# Patient Record
Sex: MALE | Race: WHITE | NOT HISPANIC OR LATINO | Employment: FULL TIME | URBAN - METROPOLITAN AREA
[De-identification: names, ages, dates, MRNs, and addresses within clinical notes are randomized per-mention and may not be internally consistent; named-entity substitution may affect disease eponyms.]

---

## 2017-09-22 DIAGNOSIS — Z12.5 SCREENING FOR PROSTATE CANCER: ICD-10-CM

## 2017-09-22 DIAGNOSIS — Z00.00 HEALTH CARE MAINTENANCE: Primary | ICD-10-CM

## 2017-09-26 LAB
ALBUMIN SERPL-MCNC: 4.5 G/DL (ref 3.5–5.2)
ALBUMIN/GLOB SERPL: 1.9 G/DL
ALP SERPL-CCNC: 91 U/L (ref 39–117)
ALT SERPL-CCNC: 19 U/L (ref 1–41)
APPEARANCE UR: CLEAR
AST SERPL-CCNC: 17 U/L (ref 1–40)
BACTERIA #/AREA URNS HPF: NORMAL /HPF
BASOPHILS # BLD AUTO: 0.03 10*3/MM3 (ref 0–0.2)
BASOPHILS NFR BLD AUTO: 0.4 % (ref 0–1.5)
BILIRUB SERPL-MCNC: 0.5 MG/DL (ref 0.1–1.2)
BILIRUB UR QL STRIP: NEGATIVE
BUN SERPL-MCNC: 19 MG/DL (ref 8–23)
BUN/CREAT SERPL: 18.1 (ref 7–25)
CALCIUM SERPL-MCNC: 9.7 MG/DL (ref 8.6–10.5)
CHLORIDE SERPL-SCNC: 102 MMOL/L (ref 98–107)
CHOLEST SERPL-MCNC: 146 MG/DL (ref 0–200)
CO2 SERPL-SCNC: 27.9 MMOL/L (ref 22–29)
COLOR UR: YELLOW
CREAT SERPL-MCNC: 1.05 MG/DL (ref 0.76–1.27)
EOSINOPHIL # BLD AUTO: 0.33 10*3/MM3 (ref 0–0.7)
EOSINOPHIL NFR BLD AUTO: 4.6 % (ref 0.3–6.2)
EPI CELLS #/AREA URNS HPF: NORMAL /HPF
ERYTHROCYTE [DISTWIDTH] IN BLOOD BY AUTOMATED COUNT: 12.9 % (ref 11.5–14.5)
GLOBULIN SER CALC-MCNC: 2.4 GM/DL
GLUCOSE SERPL-MCNC: 89 MG/DL (ref 65–99)
GLUCOSE UR QL: NEGATIVE
HBA1C MFR BLD: 5.16 % (ref 4.8–5.6)
HCT VFR BLD AUTO: 45.4 % (ref 40.4–52.2)
HDLC SERPL-MCNC: 48 MG/DL (ref 40–60)
HGB BLD-MCNC: 14.9 G/DL (ref 13.7–17.6)
HGB UR QL STRIP: NEGATIVE
IMM GRANULOCYTES # BLD: 0 10*3/MM3 (ref 0–0.03)
IMM GRANULOCYTES NFR BLD: 0 % (ref 0–0.5)
KETONES UR QL STRIP: NEGATIVE
LDLC SERPL CALC-MCNC: 82 MG/DL (ref 0–100)
LEUKOCYTE ESTERASE UR QL STRIP: NEGATIVE
LYMPHOCYTES # BLD AUTO: 1.74 10*3/MM3 (ref 0.9–4.8)
LYMPHOCYTES NFR BLD AUTO: 24.4 % (ref 19.6–45.3)
MCH RBC QN AUTO: 30.2 PG (ref 27–32.7)
MCHC RBC AUTO-ENTMCNC: 32.8 G/DL (ref 32.6–36.4)
MCV RBC AUTO: 92.1 FL (ref 79.8–96.2)
MICRO URNS: NORMAL
MICRO URNS: NORMAL
MONOCYTES # BLD AUTO: 0.44 10*3/MM3 (ref 0.2–1.2)
MONOCYTES NFR BLD AUTO: 6.2 % (ref 5–12)
MUCOUS THREADS URNS QL MICRO: PRESENT /HPF
NEUTROPHILS # BLD AUTO: 4.6 10*3/MM3 (ref 1.9–8.1)
NEUTROPHILS NFR BLD AUTO: 64.4 % (ref 42.7–76)
NITRITE UR QL STRIP: NEGATIVE
PH UR STRIP: 5.5 [PH] (ref 5–7.5)
PLATELET # BLD AUTO: 297 10*3/MM3 (ref 140–500)
POTASSIUM SERPL-SCNC: 4.7 MMOL/L (ref 3.5–5.2)
PROT SERPL-MCNC: 6.9 G/DL (ref 6–8.5)
PROT UR QL STRIP: NEGATIVE
PSA SERPL-MCNC: 1.6 NG/ML (ref 0–4)
RBC # BLD AUTO: 4.93 10*6/MM3 (ref 4.6–6)
RBC #/AREA URNS HPF: NORMAL /HPF
SODIUM SERPL-SCNC: 141 MMOL/L (ref 136–145)
SP GR UR: 1.02 (ref 1–1.03)
TRIGL SERPL-MCNC: 80 MG/DL (ref 0–150)
TSH SERPL DL<=0.005 MIU/L-ACNC: 1.14 MIU/ML (ref 0.27–4.2)
URINALYSIS REFLEX: NORMAL
UROBILINOGEN UR STRIP-MCNC: 0.2 MG/DL (ref 0.2–1)
VLDLC SERPL CALC-MCNC: 16 MG/DL (ref 5–40)
WBC # BLD AUTO: 7.14 10*3/MM3 (ref 4.5–10.7)
WBC #/AREA URNS HPF: NORMAL /HPF

## 2017-09-27 ENCOUNTER — OFFICE VISIT (OUTPATIENT)
Dept: INTERNAL MEDICINE | Facility: CLINIC | Age: 62
End: 2017-09-27

## 2017-09-27 VITALS
OXYGEN SATURATION: 99 % | DIASTOLIC BLOOD PRESSURE: 74 MMHG | SYSTOLIC BLOOD PRESSURE: 102 MMHG | WEIGHT: 237 LBS | BODY MASS INDEX: 30.42 KG/M2 | HEART RATE: 56 BPM | HEIGHT: 74 IN | RESPIRATION RATE: 18 BRPM

## 2017-09-27 DIAGNOSIS — Z00.00 HEALTH MAINTENANCE EXAMINATION: Primary | ICD-10-CM

## 2017-09-27 DIAGNOSIS — Z23 NEED FOR INFLUENZA VACCINATION: ICD-10-CM

## 2017-09-27 PROCEDURE — 90471 IMMUNIZATION ADMIN: CPT | Performed by: INTERNAL MEDICINE

## 2017-09-27 PROCEDURE — 90656 IIV3 VACC NO PRSV 0.5 ML IM: CPT | Performed by: INTERNAL MEDICINE

## 2017-09-27 PROCEDURE — 99396 PREV VISIT EST AGE 40-64: CPT | Performed by: INTERNAL MEDICINE

## 2017-09-27 NOTE — PROGRESS NOTES
Chief Complaint  Scotty Zhang is a 61 y.o. male who presents for Annual Exam (CPE)  .    History of Present Illness   Scotty is here for routine CPE.  No new concerns.  They are still deciding on their final plans for a place in [place].  They will retain a place here as well.    Prostate Exam: last year  C-scope:11/30/16 due 2021    Exercise: 15,000 steps a day.  Runs 4 days a week.  Lifts weights.      Immunization History   Administered Date(s) Administered   • Flu Vaccine Quad PF >18YRS 09/27/2017   • Influenza, Quadrivalent 11/10/2015, 10/17/2016   • Tdap 08/01/2016   • Zoster 06/08/2015       Review of Systems   Constitution: Negative for chills, fever and malaise/fatigue.   HENT: Negative for hearing loss, hoarse voice and sore throat.    Eyes: Negative for blurred vision, double vision and visual disturbance.   Cardiovascular: Negative for chest pain, leg swelling and palpitations.   Respiratory: Negative for cough and shortness of breath.    Endocrine: Negative for polydipsia and polyuria.   Hematologic/Lymphatic: Does not bruise/bleed easily.   Skin: Negative for rash and suspicious lesions.   Musculoskeletal: Negative for arthritis and myalgias.   Gastrointestinal: Negative for abdominal pain, change in bowel habit, constipation, diarrhea, dysphagia, hematochezia, melena, nausea and vomiting.   Genitourinary: Negative for dysuria and hematuria.   Neurological: Negative for dizziness, headaches and light-headedness.   Psychiatric/Behavioral: Negative for depression. The patient is not nervous/anxious.        Patient Active Problem List   Diagnosis   • Mild intermittent asthma   • Left-sided low back pain without sciatica   • Lumbar degenerative disc disease   • Abnormal x-ray of lumbar spine       Past Medical History:   Diagnosis Date   • Asthma        Past Surgical History:   Procedure Laterality Date   • COLONOSCOPY N/A 11/30/2016    Procedure: COLONOSCOPY TO CECUM AND TI WITH COLD BX POLYPECTOMY ;   "Surgeon: Aaron Patel MD;  Location: [facility] ENDOSCOPY;  Service:    • KNEE ARTHROSCOPY     • NOSE SURGERY     • THUMB ARTHROSCOPY         Family History   Problem Relation Age of Onset   • Bone cancer Mother    • Heart disease Father    • Diabetes Father    • Hypertension Father        Social History     Social History   • Marital status:      Spouse name: N/A   • Number of children: N/A   • Years of education: N/A     Occupational History   • retired      Social History Main Topics   • Smoking status: Never Smoker   • Smokeless tobacco: Never Used   • Alcohol use No   • Drug use: Not on file   • Sexual activity: Defer     Other Topics Concern   • Not on file     Social History Narrative       No current outpatient prescriptions on file prior to visit.     No current facility-administered medications on file prior to visit.        No Known Allergies    Vitals:    09/27/17 1050   BP: 102/74   Pulse: 56   Resp: 18   SpO2: 99%   Weight: 237 lb (108 kg)   Height: 74\" (188 cm)       Body mass index is 30.43 kg/(m^2).    Objective   Physical Exam   Constitutional: He is oriented to person, place, and time. He appears well-developed and well-nourished. No distress.   HENT:   Head: Normocephalic and atraumatic.   Right Ear: Hearing and external ear normal.   Left Ear: Hearing and external ear normal.   Nose: Nose normal.   Mouth/Throat: Oropharynx is clear and moist and mucous membranes are normal.   Eyes: Conjunctivae, EOM and lids are normal. Pupils are equal, round, and reactive to light. No scleral icterus.   Neck: Trachea normal and normal range of motion. Neck supple. No thyromegaly present.   Cardiovascular: Normal rate, regular rhythm, normal heart sounds and intact distal pulses.  Exam reveals no gallop and no friction rub.    No murmur heard.  Pulses:       Carotid pulses are 2+ on the right side, and 2+ on the left side.       Femoral pulses are 2+ on the right side, and 2+ on the left side.       " Dorsalis pedis pulses are 2+ on the right side, and 2+ on the left side.        Posterior tibial pulses are 2+ on the right side, and 2+ on the left side.   Pulmonary/Chest: Effort normal and breath sounds normal. No respiratory distress. He has no wheezes. He has no rales.   Abdominal: Soft. Bowel sounds are normal. He exhibits no distension and no mass. There is no tenderness.   Genitourinary: Rectum normal and prostate normal.   Musculoskeletal: Normal range of motion. He exhibits no edema.       Vascular Status -  His exam exhibits no right foot edema. His exam exhibits no left foot edema.   Skin Integrity  -  His right foot skin is intact.     Scotty 's left foot skin is intact. .  Lymphadenopathy:     He has no cervical adenopathy.        Right: No supraclavicular adenopathy present.        Left: No supraclavicular adenopathy present.   Neurological: He is alert and oriented to person, place, and time. He has normal strength. No cranial nerve deficit. He exhibits normal muscle tone. Coordination and gait normal.   Reflex Scores:       Patellar reflexes are 2+ on the right side and 2+ on the left side.  Skin: Skin is warm and dry. No rash noted.   Psychiatric: He has a normal mood and affect. His speech is normal and behavior is normal. Judgment and thought content normal. His affect is not inappropriate. Cognition and memory are normal.   Vitals reviewed.        Results for orders placed or performed in visit on 09/22/17   Comprehensive Metabolic Panel   Result Value Ref Range    Glucose 89 65 - 99 mg/dL    BUN 19 8 - 23 mg/dL    Creatinine 1.05 0.76 - 1.27 mg/dL    eGFR Non African Am 71 >60 mL/min/1.73    eGFR African Am 86 >60 mL/min/1.73    BUN/Creatinine Ratio 18.1 7.0 - 25.0    Sodium 141 136 - 145 mmol/L    Potassium 4.7 3.5 - 5.2 mmol/L    Chloride 102 98 - 107 mmol/L    Total CO2 27.9 22.0 - 29.0 mmol/L    Calcium 9.7 8.6 - 10.5 mg/dL    Total Protein 6.9 6.0 - 8.5 g/dL    Albumin 4.50 3.50 - 5.20  g/dL    Globulin 2.4 gm/dL    A/G Ratio 1.9 g/dL    Total Bilirubin 0.5 0.1 - 1.2 mg/dL    Alkaline Phosphatase 91 39 - 117 U/L    AST (SGOT) 17 1 - 40 U/L    ALT (SGPT) 19 1 - 41 U/L   Lipid Panel   Result Value Ref Range    Total Cholesterol 146 0 - 200 mg/dL    Triglycerides 80 0 - 150 mg/dL    HDL Cholesterol 48 40 - 60 mg/dL    VLDL Cholesterol 16 5 - 40 mg/dL    LDL Cholesterol  82 0 - 100 mg/dL   TSH Rfx On Abnormal To Free T4   Result Value Ref Range    TSH 1.14 0.27 - 4.2 mIU/mL   UA / M With / Rflx Culture([facility] ONLY)   Result Value Ref Range    Specific Gravity, UA 1.022 1.005 - 1.030    pH, UA 5.5 5.0 - 7.5    Color, UA Yellow Yellow    Appearance, UA Clear Clear    Leukocytes, UA Negative Negative    Protein Negative Negative/Trace    Glucose, UA Negative Negative    Ketones Negative Negative    Blood, UA Negative Negative    Bilirubin, UA Negative Negative    Urobilinogen, UA 0.2 0.2 - 1.0 mg/dL    Nitrite, UA Negative Negative    Microscopic Examination Comment     MICROSCOPIC EXAMINATION See below:     Urinalysis Reflex Comment    Hemoglobin A1c   Result Value Ref Range    Hemoglobin A1C 5.16 4.80 - 5.60 %   PSA   Result Value Ref Range    PSA 1.600 0.000 - 4.000 ng/mL   Microscopic Examination   Result Value Ref Range    WBC, UA 0-5 0 - 5 /hpf    RBC, UA 0-2 0 - 2 /hpf    Epithelial Cells (non renal) None seen 0 - 10 /hpf    Mucus, UA Present Not Estab. /hpf    Bacteria, UA None seen None seen/Few /hpf   CBC & Differential   Result Value Ref Range    WBC 7.14 4.50 - 10.70 10*3/mm3    RBC 4.93 4.60 - 6.00 10*6/mm3    Hemoglobin 14.9 13.7 - 17.6 g/dL    Hematocrit 45.4 40.4 - 52.2 %    MCV 92.1 79.8 - 96.2 fL    MCH 30.2 27.0 - 32.7 pg    MCHC 32.8 32.6 - 36.4 g/dL    RDW 12.9 11.5 - 14.5 %    Platelets 297 140 - 500 10*3/mm3    Neutrophil Rel % 64.4 42.7 - 76.0 %    Lymphocyte Rel % 24.4 19.6 - 45.3 %    Monocyte Rel % 6.2 5.0 - 12.0 %    Eosinophil Rel % 4.6 0.3 - 6.2 %    Basophil Rel % 0.4 0.0 -  1.5 %    Neutrophils Absolute 4.60 1.90 - 8.10 10*3/mm3    Lymphocytes Absolute 1.74 0.90 - 4.80 10*3/mm3    Monocytes Absolute 0.44 0.20 - 1.20 10*3/mm3    Eosinophils Absolute 0.33 0.00 - 0.70 10*3/mm3    Basophils Absolute 0.03 0.00 - 0.20 10*3/mm3    Immature Granulocyte Rel % 0.0 0.0 - 0.5 %    Immature Grans Absolute 0.00 0.00 - 0.03 10*3/mm3       Assessment/Plan   Diagnoses and all orders for this visit:    Health maintenance examination    Need for influenza vaccination  -     Flu Vaccine Quad PF >18YR        Discussion/Summary  Scotty is here for routine follow up.  He is a very healthy 62 y/o with no medical issues.  His labs all look great.  He is up to date on all health maintenance.  He exercises very regularly.  I will see him back in a year.        Return in about 1 year (around 9/27/2018) for Annual physical, with fasting labs prior.

## 2018-09-20 DIAGNOSIS — Z12.5 SCREENING FOR PROSTATE CANCER: ICD-10-CM

## 2018-09-20 DIAGNOSIS — Z00.00 HEALTH CARE MAINTENANCE: Primary | ICD-10-CM

## 2018-09-28 LAB
ALBUMIN SERPL-MCNC: 4.3 G/DL (ref 3.5–5.2)
ALBUMIN/GLOB SERPL: 1.3 G/DL
ALP SERPL-CCNC: 105 U/L (ref 39–117)
ALT SERPL-CCNC: 20 U/L (ref 1–41)
APPEARANCE UR: CLEAR
AST SERPL-CCNC: 15 U/L (ref 1–40)
BACTERIA #/AREA URNS HPF: ABNORMAL /HPF
BASOPHILS # BLD AUTO: 0.03 10*3/MM3 (ref 0–0.2)
BASOPHILS NFR BLD AUTO: 0.4 % (ref 0–1.5)
BILIRUB SERPL-MCNC: 0.7 MG/DL (ref 0.1–1.2)
BILIRUB UR QL STRIP: NEGATIVE
BUN SERPL-MCNC: 18 MG/DL (ref 8–23)
BUN/CREAT SERPL: 14.9 (ref 7–25)
CALCIUM SERPL-MCNC: 9.4 MG/DL (ref 8.6–10.5)
CASTS URNS MICRO: ABNORMAL
CASTS URNS QL MICRO: PRESENT /LPF
CHLORIDE SERPL-SCNC: 103 MMOL/L (ref 98–107)
CHOLEST SERPL-MCNC: 149 MG/DL (ref 0–200)
CO2 SERPL-SCNC: 27.4 MMOL/L (ref 22–29)
COLOR UR: YELLOW
CREAT SERPL-MCNC: 1.21 MG/DL (ref 0.76–1.27)
EOSINOPHIL # BLD AUTO: 0.3 10*3/MM3 (ref 0–0.7)
EOSINOPHIL NFR BLD AUTO: 4.1 % (ref 0.3–6.2)
EPI CELLS #/AREA URNS HPF: ABNORMAL /HPF
ERYTHROCYTE [DISTWIDTH] IN BLOOD BY AUTOMATED COUNT: 13 % (ref 11.5–14.5)
GLOBULIN SER CALC-MCNC: 3.2 GM/DL
GLUCOSE SERPL-MCNC: 92 MG/DL (ref 65–99)
GLUCOSE UR QL: NEGATIVE
HBA1C MFR BLD: 5.4 % (ref 4.8–5.6)
HCT VFR BLD AUTO: 47 % (ref 40.4–52.2)
HDLC SERPL-MCNC: 50 MG/DL (ref 40–60)
HGB BLD-MCNC: 15.2 G/DL (ref 13.7–17.6)
HGB UR QL STRIP: NEGATIVE
IMM GRANULOCYTES # BLD: 0 10*3/MM3 (ref 0–0.03)
IMM GRANULOCYTES NFR BLD: 0 % (ref 0–0.5)
KETONES UR QL STRIP: ABNORMAL
LDLC SERPL CALC-MCNC: 88 MG/DL (ref 0–100)
LEUKOCYTE ESTERASE UR QL STRIP: NEGATIVE
LYMPHOCYTES # BLD AUTO: 2.1 10*3/MM3 (ref 0.9–4.8)
LYMPHOCYTES NFR BLD AUTO: 28.5 % (ref 19.6–45.3)
MCH RBC QN AUTO: 29.3 PG (ref 27–32.7)
MCHC RBC AUTO-ENTMCNC: 32.3 G/DL (ref 32.6–36.4)
MCV RBC AUTO: 90.7 FL (ref 79.8–96.2)
MICRO URNS: ABNORMAL
MICRO URNS: ABNORMAL
MONOCYTES # BLD AUTO: 0.53 10*3/MM3 (ref 0.2–1.2)
MONOCYTES NFR BLD AUTO: 7.2 % (ref 5–12)
MUCOUS THREADS URNS QL MICRO: PRESENT /HPF
NEUTROPHILS # BLD AUTO: 4.4 10*3/MM3 (ref 1.9–8.1)
NEUTROPHILS NFR BLD AUTO: 59.8 % (ref 42.7–76)
NITRITE UR QL STRIP: NEGATIVE
PH UR STRIP: 6 [PH] (ref 5–7.5)
PLATELET # BLD AUTO: 321 10*3/MM3 (ref 140–500)
POTASSIUM SERPL-SCNC: 4.5 MMOL/L (ref 3.5–5.2)
PROT SERPL-MCNC: 7.5 G/DL (ref 6–8.5)
PROT UR QL STRIP: ABNORMAL
PSA SERPL-MCNC: 1.47 NG/ML (ref 0–4)
RBC # BLD AUTO: 5.18 10*6/MM3 (ref 4.6–6)
RBC #/AREA URNS HPF: ABNORMAL /HPF
SODIUM SERPL-SCNC: 142 MMOL/L (ref 136–145)
SP GR UR: 1.02 (ref 1–1.03)
TRIGL SERPL-MCNC: 57 MG/DL (ref 0–150)
TSH SERPL DL<=0.005 MIU/L-ACNC: 1.57 MIU/ML (ref 0.27–4.2)
URINALYSIS REFLEX: ABNORMAL
UROBILINOGEN UR STRIP-MCNC: 1 MG/DL (ref 0.2–1)
VLDLC SERPL CALC-MCNC: 11.4 MG/DL (ref 5–40)
WBC # BLD AUTO: 7.36 10*3/MM3 (ref 4.5–10.7)
WBC #/AREA URNS HPF: ABNORMAL /HPF

## 2018-10-01 ENCOUNTER — OFFICE VISIT (OUTPATIENT)
Dept: INTERNAL MEDICINE | Facility: CLINIC | Age: 63
End: 2018-10-01

## 2018-10-01 VITALS
HEART RATE: 63 BPM | RESPIRATION RATE: 18 BRPM | OXYGEN SATURATION: 98 % | HEIGHT: 74 IN | DIASTOLIC BLOOD PRESSURE: 74 MMHG | SYSTOLIC BLOOD PRESSURE: 110 MMHG | BODY MASS INDEX: 31.06 KG/M2 | WEIGHT: 242 LBS

## 2018-10-01 DIAGNOSIS — Z23 NEED FOR INFLUENZA VACCINATION: ICD-10-CM

## 2018-10-01 DIAGNOSIS — Z00.00 HEALTH MAINTENANCE EXAMINATION: Primary | ICD-10-CM

## 2018-10-01 PROCEDURE — G0008 ADMIN INFLUENZA VIRUS VAC: HCPCS | Performed by: INTERNAL MEDICINE

## 2018-10-01 PROCEDURE — 90674 CCIIV4 VAC NO PRSV 0.5 ML IM: CPT | Performed by: INTERNAL MEDICINE

## 2018-10-01 PROCEDURE — 99396 PREV VISIT EST AGE 40-64: CPT | Performed by: INTERNAL MEDICINE

## 2018-10-01 NOTE — PROGRESS NOTES
Chief Complaint  Scotty Zhang is a 62 y.o. male who presents for Annual Exam (CPE)  .    History of Present Illness   Scotty is here for routine CPE.  No  New concerns.  He is living in [place] full time.    Prostate Exam: last year  C-scope:11/30/2016, repeat in 2021    Exercise: Walks and or runs 6 miles daily, lifts weights, 4 times weekly, stretching classes as well.     Immunization History   Administered Date(s) Administered   • Flu Mist 11/10/2015, 10/17/2016   • Flu Vaccine Quad PF >18YRS 09/27/2017   • Tdap 08/01/2016   • Zostavax 06/08/2015   • flucelvax quad pfs =>4 YRS 10/01/2018       Review of Systems   Constitution: Negative for chills, fever and malaise/fatigue.   HENT: Negative for hearing loss, hoarse voice and sore throat.    Eyes: Negative for blurred vision, double vision and visual disturbance.   Cardiovascular: Negative for chest pain, leg swelling and palpitations.   Respiratory: Negative for cough and shortness of breath.    Endocrine: Negative for polydipsia and polyuria.   Hematologic/Lymphatic: Does not bruise/bleed easily.   Skin: Negative for rash and suspicious lesions.   Musculoskeletal: Negative for arthritis and myalgias.   Gastrointestinal: Negative for bloating, abdominal pain, change in bowel habit, constipation, diarrhea, dysphagia, hematochezia, melena, nausea and vomiting.   Genitourinary: Negative for dysuria and hematuria.   Neurological: Negative for dizziness, headaches and light-headedness.   Psychiatric/Behavioral: Negative for depression. The patient is not nervous/anxious.        Patient Active Problem List   Diagnosis   • Mild intermittent asthma   • Lumbar degenerative disc disease   • Abnormal x-ray of lumbar spine       Past Medical History:   Diagnosis Date   • Asthma        Past Surgical History:   Procedure Laterality Date   • COLONOSCOPY N/A 11/30/2016    Procedure: COLONOSCOPY TO CECUM AND TI WITH COLD BX POLYPECTOMY ;  Surgeon: Aaron Patel MD;   "Location: [facility] ENDOSCOPY;  Service:    • KNEE ARTHROSCOPY     • NOSE SURGERY     • THUMB ARTHROSCOPY         Family History   Problem Relation Age of Onset   • Bone cancer Mother    • Heart disease Father    • Diabetes Father    • Hypertension Father        Social History     Social History   • Marital status:      Spouse name: N/A   • Number of children: N/A   • Years of education: N/A     Occupational History   • retired      Social History Main Topics   • Smoking status: Never Smoker   • Smokeless tobacco: Never Used   • Alcohol use No   • Drug use: No   • Sexual activity: Defer     Other Topics Concern   • Not on file     Social History Narrative   • No narrative on file       No current outpatient prescriptions on file prior to visit.     No current facility-administered medications on file prior to visit.        No Known Allergies    Vitals:    10/01/18 0812   BP: 110/74   Pulse: 63   Resp: 18   SpO2: 98%   Weight: 110 kg (242 lb)   Height: 188 cm (74.02\")       Body mass index is 31.06 kg/m².    Objective   Physical Exam   Constitutional: He is oriented to person, place, and time. He appears well-developed and well-nourished. No distress.   HENT:   Head: Normocephalic and atraumatic.   Right Ear: Hearing and external ear normal.   Left Ear: Hearing and external ear normal.   Nose: Nose normal.   Mouth/Throat: Oropharynx is clear and moist and mucous membranes are normal.   Eyes: Pupils are equal, round, and reactive to light. Conjunctivae, EOM and lids are normal. No scleral icterus.   Neck: Trachea normal and normal range of motion. Neck supple.   Cardiovascular: Normal rate, regular rhythm and normal heart sounds.  Exam reveals no gallop and no friction rub.    No murmur heard.  Pulses:       Carotid pulses are 2+ on the right side, and 2+ on the left side.       Femoral pulses are 2+ on the right side, and 2+ on the left side.       Dorsalis pedis pulses are 2+ on the right side, and 2+ on the " left side.        Posterior tibial pulses are 2+ on the right side, and 2+ on the left side.   Pulmonary/Chest: Effort normal and breath sounds normal. No respiratory distress. He has no wheezes. He has no rales.   Abdominal: Soft. Normal appearance and bowel sounds are normal. He exhibits no distension and no mass. There is no tenderness.   Genitourinary: Rectum normal and prostate normal.   Musculoskeletal: Normal range of motion. He exhibits no edema.     Vascular Status -  His right foot exhibits no edema. His left foot exhibits no edema.  Skin Integrity  -  His right foot skin is intact.His left foot skin is intact..  Lymphadenopathy:     He has no cervical adenopathy.        Right: No inguinal and no supraclavicular adenopathy present.        Left: No inguinal and no supraclavicular adenopathy present.   Neurological: He is alert and oriented to person, place, and time. He has normal strength. No cranial nerve deficit. He exhibits normal muscle tone. Coordination and gait normal.   Skin: Skin is warm and dry. No rash noted.   Psychiatric: He has a normal mood and affect. His speech is normal and behavior is normal. Judgment and thought content normal. Cognition and memory are normal.   Vitals reviewed.        Results for orders placed or performed in visit on 09/20/18   Comprehensive Metabolic Panel   Result Value Ref Range    Glucose 92 65 - 99 mg/dL    BUN 18 8 - 23 mg/dL    Creatinine 1.21 0.76 - 1.27 mg/dL    eGFR Non African Am 60 (L) >60 mL/min/1.73    eGFR African Am 73 >60 mL/min/1.73    BUN/Creatinine Ratio 14.9 7.0 - 25.0    Sodium 142 136 - 145 mmol/L    Potassium 4.5 3.5 - 5.2 mmol/L    Chloride 103 98 - 107 mmol/L    Total CO2 27.4 22.0 - 29.0 mmol/L    Calcium 9.4 8.6 - 10.5 mg/dL    Total Protein 7.5 6.0 - 8.5 g/dL    Albumin 4.30 3.50 - 5.20 g/dL    Globulin 3.2 gm/dL    A/G Ratio 1.3 g/dL    Total Bilirubin 0.7 0.1 - 1.2 mg/dL    Alkaline Phosphatase 105 39 - 117 U/L    AST (SGOT) 15 1 - 40  U/L    ALT (SGPT) 20 1 - 41 U/L   Lipid Panel   Result Value Ref Range    Total Cholesterol 149 0 - 200 mg/dL    Triglycerides 57 0 - 150 mg/dL    HDL Cholesterol 50 40 - 60 mg/dL    VLDL Cholesterol 11.4 5 - 40 mg/dL    LDL Cholesterol  88 0 - 100 mg/dL   TSH Rfx On Abnormal To Free T4   Result Value Ref Range    TSH 1.57 0.27 - 4.2 mIU/mL   Hemoglobin A1c   Result Value Ref Range    Hemoglobin A1C 5.40 4.80 - 5.60 %   Urinalysis With Culture If Indicated - Urine, Clean Catch   Result Value Ref Range    Specific Gravity, UA 1.024 1.005 - 1.030    pH, UA 6.0 5.0 - 7.5    Color, UA Yellow Yellow    Appearance, UA Clear Clear    Leukocytes, UA Negative Negative    Protein Trace Negative/Trace    Glucose, UA Negative Negative    Ketones Trace (A) Negative    Blood, UA Negative Negative    Bilirubin, UA Negative Negative    Urobilinogen, UA 1.0 0.2 - 1.0 mg/dL    Nitrite, UA Negative Negative    Microscopic Examination Comment     MICROSCOPIC EXAMINATION See below:     Urinalysis Reflex Comment    Microscopic Examination   Result Value Ref Range    WBC, UA 0-5 0 - 5 /hpf    RBC, UA 0-2 0 - 2 /hpf    Epithelial Cells (non renal) 0-10 0 - 10 /hpf    Casts Present (A) None seen /lpf    Cast Type Hyaline casts N/A    Mucus, UA Present Not Estab. /hpf    Bacteria, UA Few None seen/Few /hpf   PSA DIAGNOSTIC   Result Value Ref Range    PSA 1.470 0.000 - 4.000 ng/mL   CBC & Differential   Result Value Ref Range    WBC 7.36 4.50 - 10.70 10*3/mm3    RBC 5.18 4.60 - 6.00 10*6/mm3    Hemoglobin 15.2 13.7 - 17.6 g/dL    Hematocrit 47.0 40.4 - 52.2 %    MCV 90.7 79.8 - 96.2 fL    MCH 29.3 27.0 - 32.7 pg    MCHC 32.3 (L) 32.6 - 36.4 g/dL    RDW 13.0 11.5 - 14.5 %    Platelets 321 140 - 500 10*3/mm3    Neutrophil Rel % 59.8 42.7 - 76.0 %    Lymphocyte Rel % 28.5 19.6 - 45.3 %    Monocyte Rel % 7.2 5.0 - 12.0 %    Eosinophil Rel % 4.1 0.3 - 6.2 %    Basophil Rel % 0.4 0.0 - 1.5 %    Neutrophils Absolute 4.40 1.90 - 8.10 10*3/mm3     Lymphocytes Absolute 2.10 0.90 - 4.80 10*3/mm3    Monocytes Absolute 0.53 0.20 - 1.20 10*3/mm3    Eosinophils Absolute 0.30 0.00 - 0.70 10*3/mm3    Basophils Absolute 0.03 0.00 - 0.20 10*3/mm3    Immature Granulocyte Rel % 0.0 0.0 - 0.5 %    Immature Grans Absolute 0.00 0.00 - 0.03 10*3/mm3       Assessment/Plan   Diagnoses and all orders for this visit:    Health maintenance examination  -     Flucelvax Quad=>4Years ([id number])    Need for influenza vaccination  -     Flucelvax Quad=>4Years ([id number])        Discussion/Summary  Scotty is here for routine CPE.  He is a very healthy 61 y/o.  Flu shot today.  Advised to look into the new shingles vaccine at his local pharmacy. Continue regular exercise.  Labs all look great.        No Follow-up on file.